# Patient Record
Sex: MALE | Race: WHITE | URBAN - NONMETROPOLITAN AREA
[De-identification: names, ages, dates, MRNs, and addresses within clinical notes are randomized per-mention and may not be internally consistent; named-entity substitution may affect disease eponyms.]

---

## 2022-02-14 ENCOUNTER — LAB OUTSIDE AN ENCOUNTER (OUTPATIENT)
Dept: URBAN - NONMETROPOLITAN AREA CLINIC 4 | Facility: CLINIC | Age: 67
End: 2022-02-14

## 2022-02-14 ENCOUNTER — WEB ENCOUNTER (OUTPATIENT)
Dept: URBAN - NONMETROPOLITAN AREA CLINIC 4 | Facility: CLINIC | Age: 67
End: 2022-02-14

## 2022-02-14 ENCOUNTER — TELEPHONE ENCOUNTER (OUTPATIENT)
Dept: URBAN - NONMETROPOLITAN AREA CLINIC 4 | Facility: CLINIC | Age: 67
End: 2022-02-14

## 2022-02-14 ENCOUNTER — OFFICE VISIT (OUTPATIENT)
Dept: URBAN - NONMETROPOLITAN AREA CLINIC 4 | Facility: CLINIC | Age: 67
End: 2022-02-14
Payer: MEDICARE

## 2022-02-14 VITALS
TEMPERATURE: 97.5 F | WEIGHT: 217.2 LBS | HEIGHT: 73 IN | DIASTOLIC BLOOD PRESSURE: 86 MMHG | HEART RATE: 85 BPM | SYSTOLIC BLOOD PRESSURE: 144 MMHG | BODY MASS INDEX: 28.79 KG/M2

## 2022-02-14 DIAGNOSIS — K30 INDIGESTION: ICD-10-CM

## 2022-02-14 DIAGNOSIS — K62.5 BRBPR (BRIGHT RED BLOOD PER RECTUM): ICD-10-CM

## 2022-02-14 DIAGNOSIS — R63.4 WEIGHT LOSS: ICD-10-CM

## 2022-02-14 DIAGNOSIS — K59.00 CONSTIPATION, UNSPECIFIED CONSTIPATION TYPE: ICD-10-CM

## 2022-02-14 DIAGNOSIS — R10.30 LOWER ABDOMINAL PAIN: ICD-10-CM

## 2022-02-14 PROBLEM — 162031009: Status: ACTIVE | Noted: 2022-02-14

## 2022-02-14 PROCEDURE — 99204 OFFICE O/P NEW MOD 45 MIN: CPT | Performed by: REGISTERED NURSE

## 2022-02-14 RX ORDER — FLUTICASONE FUROATE, UMECLIDINIUM BROMIDE AND VILANTEROL TRIFENATATE 200; 62.5; 25 UG/1; UG/1; UG/1
1 PUFF POWDER RESPIRATORY (INHALATION) ONCE A DAY
Status: ACTIVE | COMMUNITY

## 2022-02-14 RX ORDER — DOXAZOSIN MESYLATE 8 MG/1
1 TABLET TABLET ORAL ONCE A DAY
Status: ACTIVE | COMMUNITY

## 2022-02-14 RX ORDER — SODIUM, POTASSIUM,MAG SULFATES 17.5-3.13G
177 ML SOLUTION, RECONSTITUTED, ORAL ORAL
Qty: 1 BOTTLE | Refills: 0 | OUTPATIENT
Start: 2022-02-14 | End: 2022-02-15

## 2022-02-14 RX ORDER — POLYETHYLENE GLYCOL 3350, SODIUM CHLORIDE, SODIUM BICARBONATE, POTASSIUM CHLORIDE 420; 11.2; 5.72; 1.48 G/4L; G/4L; G/4L; G/4L
AS DIRECTED POWDER, FOR SOLUTION ORAL
Qty: 1 BOTTLE | Refills: 0 | OUTPATIENT

## 2022-02-14 RX ORDER — LINACLOTIDE 145 UG/1
1 CAPSULE AT LEAST 30 MINUTES BEFORE THE FIRST MEAL OF THE DAY ON AN EMPTY STOMACH CAPSULE, GELATIN COATED ORAL ONCE A DAY
Qty: 90 | Refills: 1 | OUTPATIENT
Start: 2022-02-14 | End: 2022-08-13

## 2022-02-14 RX ORDER — OLMESARTAN MEDOXOMIL 20 MG/1
1 TABLET TABLET, FILM COATED ORAL ONCE A DAY
Status: ACTIVE | COMMUNITY

## 2022-02-14 NOTE — HPI-TODAY'S VISIT:
2/14/22: Pt is a 67 yo male with PMH of COPD, HTN, BPH who was referred by Dr. Yoav Vidal for evaluation of abdominal pain, constipation and blood in stool.   Pt reports constipation with associated lower abdominal pain for over a year. Pain is intermittent and sharp in nature. He was recently prescribed lactulose, which causes bloating and loose stools. If he does not take lactulose, only passes small pieces of stool. Has taken Miralax and Senna in past, which does not help .He also reports intermittent small volume rectal bleeding on daily basis. This past Saturday, he had large volume rectal bleeding. States he used to weigh 256 lbs in 2019 and is currently 217 lbs. Pt has never had a screening colonoscopy. No FHx of GI malignancy. He also report postprandial indigestion, belching and early satiety. Denies N/V, dysphagia. Takes Pepto Bismol with some relief. Also takes [employer] 4 in 1 digestive [employer].

## 2022-02-15 ENCOUNTER — TELEPHONE ENCOUNTER (OUTPATIENT)
Dept: URBAN - NONMETROPOLITAN AREA CLINIC 4 | Facility: CLINIC | Age: 67
End: 2022-02-15

## 2022-02-15 LAB — LIPASE: 26

## 2022-02-21 ENCOUNTER — CLAIMS CREATED FROM THE CLAIM WINDOW (OUTPATIENT)
Dept: URBAN - METROPOLITAN AREA SURGERY CENTER 14 | Facility: SURGERY CENTER | Age: 67
End: 2022-02-21
Payer: MEDICARE

## 2022-02-21 ENCOUNTER — CLAIMS CREATED FROM THE CLAIM WINDOW (OUTPATIENT)
Dept: URBAN - METROPOLITAN AREA CLINIC 4 | Facility: CLINIC | Age: 67
End: 2022-02-21
Payer: MEDICARE

## 2022-02-21 ENCOUNTER — CLAIMS CREATED FROM THE CLAIM WINDOW (OUTPATIENT)
Dept: URBAN - METROPOLITAN AREA SURGERY CENTER 14 | Facility: SURGERY CENTER | Age: 67
End: 2022-02-21

## 2022-02-21 DIAGNOSIS — K92.1 ACUTE MELENA: ICD-10-CM

## 2022-02-21 DIAGNOSIS — C20 ADENOCARCINOMA OF RECTUM: ICD-10-CM

## 2022-02-21 DIAGNOSIS — C18.9 MALIGNANT NEOPLASM OF COLON, UNSPECIFIED: ICD-10-CM

## 2022-02-21 DIAGNOSIS — R10.30 ABDOMINAL PAIN OF UNKNOWN CAUSE: ICD-10-CM

## 2022-02-21 DIAGNOSIS — D12.5 ADENOMA OF SIGMOID COLON: ICD-10-CM

## 2022-02-21 DIAGNOSIS — D12.5 BENIGN NEOPLASM OF SIGMOID COLON: ICD-10-CM

## 2022-02-21 DIAGNOSIS — K59.09 CHANGE IN BOWEL MOVEMENTS INTERMITTENT CONSTIPATION. URGENCY IN THE MORNING.: ICD-10-CM

## 2022-02-21 DIAGNOSIS — K63.89 GASEOUS DISTENTION OF INTESTINE DETERMINED BY X-RAY: ICD-10-CM

## 2022-02-21 PROCEDURE — 88305 TISSUE EXAM BY PATHOLOGIST: CPT | Performed by: PATHOLOGY

## 2022-02-21 PROCEDURE — 45380 COLONOSCOPY AND BIOPSY: CPT | Performed by: STUDENT IN AN ORGANIZED HEALTH CARE EDUCATION/TRAINING PROGRAM

## 2022-02-21 PROCEDURE — 45385 COLONOSCOPY W/LESION REMOVAL: CPT | Performed by: STUDENT IN AN ORGANIZED HEALTH CARE EDUCATION/TRAINING PROGRAM

## 2022-02-21 PROCEDURE — 88342 IMHCHEM/IMCYTCHM 1ST ANTB: CPT | Performed by: PATHOLOGY

## 2022-02-21 PROCEDURE — G8907 PT DOC NO EVENTS ON DISCHARG: HCPCS | Performed by: STUDENT IN AN ORGANIZED HEALTH CARE EDUCATION/TRAINING PROGRAM

## 2022-02-21 PROCEDURE — 88341 IMHCHEM/IMCYTCHM EA ADD ANTB: CPT | Performed by: PATHOLOGY

## 2022-02-22 ENCOUNTER — TELEPHONE ENCOUNTER (OUTPATIENT)
Dept: URBAN - METROPOLITAN AREA CLINIC 54 | Facility: CLINIC | Age: 67
End: 2022-02-22

## 2022-03-01 ENCOUNTER — LAB OUTSIDE AN ENCOUNTER (OUTPATIENT)
Dept: URBAN - METROPOLITAN AREA CLINIC 54 | Facility: CLINIC | Age: 67
End: 2022-03-01

## 2022-03-03 ENCOUNTER — TELEPHONE ENCOUNTER (OUTPATIENT)
Dept: URBAN - METROPOLITAN AREA CLINIC 54 | Facility: CLINIC | Age: 67
End: 2022-03-03

## 2022-03-07 ENCOUNTER — WEB ENCOUNTER (OUTPATIENT)
Dept: URBAN - NONMETROPOLITAN AREA CLINIC 4 | Facility: CLINIC | Age: 67
End: 2022-03-07

## 2022-03-07 ENCOUNTER — OFFICE VISIT (OUTPATIENT)
Dept: URBAN - NONMETROPOLITAN AREA CLINIC 4 | Facility: CLINIC | Age: 67
End: 2022-03-07
Payer: MEDICARE

## 2022-03-07 ENCOUNTER — DASHBOARD ENCOUNTERS (OUTPATIENT)
Age: 67
End: 2022-03-07

## 2022-03-07 VITALS
BODY MASS INDEX: 28.26 KG/M2 | DIASTOLIC BLOOD PRESSURE: 97 MMHG | HEIGHT: 73 IN | SYSTOLIC BLOOD PRESSURE: 169 MMHG | TEMPERATURE: 97.7 F | WEIGHT: 213.2 LBS | HEART RATE: 80 BPM

## 2022-03-07 DIAGNOSIS — K62.89 RECTAL MASS: ICD-10-CM

## 2022-03-07 DIAGNOSIS — C20 RECTAL ADENOCARCINOMA: ICD-10-CM

## 2022-03-07 DIAGNOSIS — K59.00 CONSTIPATION, UNSPECIFIED CONSTIPATION TYPE: ICD-10-CM

## 2022-03-07 DIAGNOSIS — K76.9 LIVER LESION: ICD-10-CM

## 2022-03-07 PROBLEM — 14760008: Status: ACTIVE | Noted: 2022-02-14

## 2022-03-07 PROBLEM — 300331000: Status: ACTIVE | Noted: 2022-03-07

## 2022-03-07 PROBLEM — 254582000: Status: ACTIVE | Noted: 2022-03-01

## 2022-03-07 PROCEDURE — 99213 OFFICE O/P EST LOW 20 MIN: CPT | Performed by: REGISTERED NURSE

## 2022-03-07 RX ORDER — POLYETHYLENE GLYCOL 3350, SODIUM CHLORIDE, SODIUM BICARBONATE, POTASSIUM CHLORIDE 420; 11.2; 5.72; 1.48 G/4L; G/4L; G/4L; G/4L
AS DIRECTED POWDER, FOR SOLUTION ORAL
Qty: 1 BOTTLE | Refills: 0 | Status: ACTIVE | COMMUNITY

## 2022-03-07 RX ORDER — FLUTICASONE FUROATE, UMECLIDINIUM BROMIDE AND VILANTEROL TRIFENATATE 200; 62.5; 25 UG/1; UG/1; UG/1
1 PUFF POWDER RESPIRATORY (INHALATION) ONCE A DAY
Status: ACTIVE | COMMUNITY

## 2022-03-07 RX ORDER — DOXAZOSIN MESYLATE 8 MG/1
1 TABLET TABLET ORAL ONCE A DAY
Status: ACTIVE | COMMUNITY

## 2022-03-07 RX ORDER — LINACLOTIDE 145 UG/1
1 CAPSULE AT LEAST 30 MINUTES BEFORE THE FIRST MEAL OF THE DAY ON AN EMPTY STOMACH CAPSULE, GELATIN COATED ORAL ONCE A DAY
Qty: 90 | Refills: 1 | Status: ACTIVE | COMMUNITY

## 2022-03-07 RX ORDER — OLMESARTAN MEDOXOMIL 20 MG/1
1 TABLET TABLET, FILM COATED ORAL ONCE A DAY
Status: ACTIVE | COMMUNITY

## 2022-03-07 NOTE — HPI-TODAY'S VISIT:
2/14/22: Pt is a 65 yo male with PMH of COPD, HTN, BPH who was referred by Dr. Yoav Vidal for evaluation of abdominal pain, constipation and blood in stool.   Pt reports constipation with associated lower abdominal pain for over a year. Pain is intermittent and sharp in nature. He was recently prescribed lactulose, which causes bloating and loose stools. If he does not take lactulose, only passes small pieces of stool. Has taken Miralax and Senna in past, which does not help .He also reports intermittent small volume rectal bleeding on daily basis. This past Saturday, he had large volume rectal bleeding. States he used to weigh 256 lbs in 2019 and is currently 217 lbs. Pt has never had a screening colonoscopy. No FHx of GI malignancy. He also report postprandial indigestion, belching and early satiety. Denies N/V, dysphagia. Takes Pepto Bismol with some relief. Also takes [email or web address] 4 in 1 digestive health.  3/7/22: Pt RTC for f/u. Had cscope 2/21/22: - Preparation of the colon was fair. - Rectal mass. - Diverticulosis in the sigmoid colon, in the descending colon, in the transverse colon and in the ascending colon. - At least 10 3 to 7 mm polyps in the sigmoid colon, 4 of which were removed with a cold snare. Resected and retrieved. - Likely malignant tumor in the rectum. Biopsied. Rectal bx c/w invasive adenocarcinoma. Sigmoid colon polyps c/w TA and hyperplastic polyps.  He was referred to Dr. Whyte and was seen on 3/2/22.  CT C/A/P 3/3/22 showed nonspecific hypoattenuating lesions in the liver which are subcentimeter in size and incompletely characterized. No prior contrast-enhanced examinations available for comparison. While these may represent hepatic cysts, metastatic disease is not excluded given patient's history. MRI with contrast is recommended for further characterization.  He is taking Linzess as needed as it will cause diarrhea. He denies any abdominal pain. He is scheduled for MRI rectum on 3/28/22.

## 2022-07-01 ENCOUNTER — TELEPHONE ENCOUNTER (OUTPATIENT)
Dept: URBAN - METROPOLITAN AREA CLINIC 23 | Facility: CLINIC | Age: 67
End: 2022-07-01